# Patient Record
Sex: MALE | Race: WHITE | NOT HISPANIC OR LATINO | Employment: OTHER | URBAN - METROPOLITAN AREA
[De-identification: names, ages, dates, MRNs, and addresses within clinical notes are randomized per-mention and may not be internally consistent; named-entity substitution may affect disease eponyms.]

---

## 2024-08-07 ENCOUNTER — EVALUATION (OUTPATIENT)
Dept: PHYSICAL THERAPY | Facility: CLINIC | Age: 74
End: 2024-08-07
Payer: MEDICARE

## 2024-08-07 DIAGNOSIS — M25.562 CHRONIC PAIN OF LEFT KNEE: Primary | ICD-10-CM

## 2024-08-07 DIAGNOSIS — G89.29 CHRONIC PAIN OF LEFT KNEE: Primary | ICD-10-CM

## 2024-08-07 PROCEDURE — 97110 THERAPEUTIC EXERCISES: CPT | Performed by: PHYSICAL THERAPIST

## 2024-08-07 PROCEDURE — 97161 PT EVAL LOW COMPLEX 20 MIN: CPT | Performed by: PHYSICAL THERAPIST

## 2024-08-07 PROCEDURE — 97140 MANUAL THERAPY 1/> REGIONS: CPT | Performed by: PHYSICAL THERAPIST

## 2024-08-07 NOTE — PROGRESS NOTES
PT Evaluation     Today's date: 2024  Patient name: Jose Roach  : 1950  MRN: [id number]  Referring provider: Dax Leung, *  Dx:   Encounter Diagnosis     ICD-10-CM    1. Chronic pain of left knee  M25.562     G89.29                      Assessment  Impairments: abnormal muscle firing, abnormal muscle tone, abnormal or restricted ROM, impaired physical strength, lacks appropriate home exercise program and pain with function    Assessment details: Jose Roach is a pleasant 74 y.o. male presents with left knee pain. Pt with significant hamstring and RF restriction. Educated on stretching for home to address. Continue with further mobilization as tolerated.  No further referral appears necessary at this time.       Skilled PT services appropriate to facilitate return to prior level of function with transition to home exercise program for independent management when appropriate.    Understanding of Dx/Px/POC: good     Prognosis: good    Goals  Patient will be able to manage symptoms independently  LT weeks  1. pt will reach foto predicted  2. pt will decrease worst pain 75%   ST weeks  1. Pt will decrease worst pain 50%  2. Patient will successfully manage HEP        Plan  Patient would benefit from: skilled PT  Referral necessary: No  Planned modality interventions: thermotherapy: hydrocollator packs    Planned therapy interventions: home exercise program, manual therapy, neuromuscular re-education, patient education, functional ROM exercises, strengthening, stretching, joint mobilization, graded activity, graded exercise, therapeutic exercise, body mechanics training, motor coordination training and activity modification    Frequency: 2x week  Duration in weeks: 12  Treatment plan discussed with: patient        Subjective Evaluation    History of Present Illness  Mechanism of injury: Pt had a scope of the knee years ago and things have been worsening since. He had CSI in July  and felt relief for 2 days. Pain has been on the medial knee. He is taking meloxicam which is helping. He has continued with daily activiy including walking 4 miles once a week and going to the gym. Carrying more weight tends to botherPain can linger for about a day but then improves again.  He is attempting to push off a knee replacement for 1 year.         L knee MRI: Impression:  1. Radial tear medial meniscal posterior horn, involving the meniscal root.  There is severe medial compartment primary osteoarthritis which has worsened  compared with prior study..    2. 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    3. Mild lateral compartment primary osteoarthritis.    4. Prominent joint effusion.     Pain  Current pain ratin  At best pain ratin  At worst pain ratin          Objective     Strength/Myotome Testing     Left Knee   Flexion: 5  Extension: 5  Quadriceps contraction: good    Right Knee   Flexion: 5  Extension: 5  Quadriceps contraction: good    General Comments:      Knee Comments  L Patellar inferior/medial mobility limited  L medial hamstring significant restriction  L prone knee flexion 95*  Standing knee posture: increased varus b/l    Improvement in squatting pain post patellar/hamstring mobilization               Precautions: none      Manuals             Medial hamstring STM CB            Patellar mobs CB            Medial joint line gapping nv                         Neuro Re-Ed                                                                                                        Ther Ex             Hamstring active elongation reviewed            EOT RF stretch w/strap reviewed            bike nv            Standing gastroc stretch nv            bridge nv            SL clamshell nv            SL hip abd nv                         Ther Activity                                       Gait Training                                       Modalities

## 2024-08-07 NOTE — LETTER
2024    Dax Leung, DO  [address] [place] [address]    Patient: Jose Roach   YOB: 1950   Date of Visit: 2024     Encounter Diagnosis     ICD-10-CM    1. Chronic pain of left knee  M25.562     G89.29           Dear Dr. Leung:    Thank you for your recent referral of Jose Roach. Please review the attached evaluation summary from Jose's recent visit.     Please verify that you agree with the plan of care by signing the attached order.     If you have any questions or concerns, please do not hesitate to call.     I sincerely appreciate the opportunity to share in the care of one of your patients and hope to have another opportunity to work with you in the near future.       Sincerely,    Andrés Gutierrez, PT      Referring Provider:      I certify that I have read the below Plan of Care and certify the need for these services furnished under this plan of treatment while under my care.                    Dax Leung DO  [address] [place] [address]  Via In Basket          PT Evaluation     Today's date: 2024  Patient name: Jose Roach  : 1950  MRN: [id number]  Referring provider: Dax Leung, *  Dx:   Encounter Diagnosis     ICD-10-CM    1. Chronic pain of left knee  M25.562     G89.29                      Assessment  Impairments: abnormal muscle firing, abnormal muscle tone, abnormal or restricted ROM, impaired physical strength, lacks appropriate home exercise program and pain with function    Assessment details: Jose Roach is a pleasant 74 y.o. male presents with left knee pain. Pt with significant hamstring and RF restriction. Educated on stretching for home to address. Continue with further mobilization as tolerated.  No further referral appears necessary at this time.       Skilled PT services appropriate to facilitate return to prior level of function with transition to home exercise program  for independent management when appropriate.    Understanding of Dx/Px/POC: good     Prognosis: good    Goals  Patient will be able to manage symptoms independently  LT weeks  1. pt will reach foto predicted  2. pt will decrease worst pain 75%   ST weeks  1. Pt will decrease worst pain 50%  2. Patient will successfully manage HEP        Plan  Patient would benefit from: skilled PT  Referral necessary: No  Planned modality interventions: thermotherapy: hydrocollator packs    Planned therapy interventions: home exercise program, manual therapy, neuromuscular re-education, patient education, functional ROM exercises, strengthening, stretching, joint mobilization, graded activity, graded exercise, therapeutic exercise, body mechanics training, motor coordination training and activity modification    Frequency: 2x week  Duration in weeks: 12  Treatment plan discussed with: patient        Subjective Evaluation    History of Present Illness  Mechanism of injury: Pt had a scope of the knee years ago and things have been worsening since. He had CSI in July and felt relief for 2 days. Pain has been on the medial knee. He is taking meloxicam which is helping. He has continued with daily activiy including walking 4 miles once a week and going to the gym. Carrying more weight tends to botherPain can linger for about a day but then improves again.  He is attempting to push off a knee replacement for 1 year.         L knee MRI: Impression:  1. Radial tear medial meniscal posterior horn, involving the meniscal root.  There is severe medial compartment primary osteoarthritis which has worsened  compared with prior study..    2. 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    3. Mild lateral compartment primary osteoarthritis.    4. Prominent joint effusion.     Pain  Current pain ratin  At best pain ratin  At worst pain ratin          Objective     Strength/Myotome Testing     Left Knee   Flexion: 5  Extension:  5  Quadriceps contraction: good    Right Knee   Flexion: 5  Extension: 5  Quadriceps contraction: good    General Comments:      Knee Comments  L Patellar inferior/medial mobility limited  L medial hamstring significant restriction  L prone knee flexion 95*  Standing knee posture: increased varus b/l    Improvement in squatting pain post patellar/hamstring mobilization               Precautions: none      Manuals 8/7            Medial hamstring STM CB            Patellar mobs CB            Medial joint line gapping nv                         Neuro Re-Ed                                                                                                        Ther Ex             Hamstring active elongation reviewed            EOT RF stretch w/strap reviewed            bike nv            Standing gastroc stretch nv            bridge nv            SL clamshell nv            SL hip abd nv                         Ther Activity                                       Gait Training                                       Modalities

## 2024-08-09 ENCOUNTER — OFFICE VISIT (OUTPATIENT)
Dept: PHYSICAL THERAPY | Facility: CLINIC | Age: 74
End: 2024-08-09
Payer: MEDICARE

## 2024-08-09 DIAGNOSIS — G89.29 CHRONIC PAIN OF LEFT KNEE: Primary | ICD-10-CM

## 2024-08-09 DIAGNOSIS — M25.562 CHRONIC PAIN OF LEFT KNEE: Primary | ICD-10-CM

## 2024-08-09 PROCEDURE — 97110 THERAPEUTIC EXERCISES: CPT

## 2024-08-09 PROCEDURE — 97140 MANUAL THERAPY 1/> REGIONS: CPT

## 2024-08-09 NOTE — PROGRESS NOTES
"Daily Note     Today's date: 2024  Patient name: Jose Roach  : 1950  MRN: [id number]  Referring provider: Dax Leung, *  Dx:   Encounter Diagnosis     ICD-10-CM    1. Chronic pain of left knee  M25.562     G89.29           Start Time: 0730  Stop Time: 0810  Total time in clinic (min): 40 minutes    Subjective: Pt reports he was a little \" sore\" from previous session, however it dissipated after a day.  Yesterday he did a lot of walking.       Objective: See treatment diary below      Assessment: Tolerated treatment well. Session began on bike for ROM and warm-up to promote blood flow circulation in preparation for therapy today. Pt displayed hypomobility in L patella- PT performed mobs to address deficits. Pt benefited from STM to medial knee/ HS to release soft tissue tension with some relief noted post treatment.  Pt benefits form VC' and demo's for proximal hip TE's to improve intended musculature recruitment with good response and practice.  Patient would benefit from continued PT       Plan: Continue per plan of care.      Precautions: none      Manuals            Medial hamstring STM CB JM            Patellar mobs CB CB     PROM  JM           Medial joint line gapping nv CB                        Neuro Re-Ed                                                                                                        Ther Ex             Hamstring active elongation reviewed 30\" x3           EOT RF stretch w/strap reviewed 30\" x3           bike nv 8'           Standing gastroc stretch nv 30\" x3           bridge nv 3\" 2x10            SL clamshell nv 2x10           SL hip abd nv 2x10                        Ther Activity                                       Gait Training                                       Modalities                                            "

## 2024-08-16 ENCOUNTER — OFFICE VISIT (OUTPATIENT)
Dept: PHYSICAL THERAPY | Facility: CLINIC | Age: 74
End: 2024-08-16
Payer: MEDICARE

## 2024-08-16 DIAGNOSIS — M25.562 CHRONIC PAIN OF LEFT KNEE: Primary | ICD-10-CM

## 2024-08-16 DIAGNOSIS — G89.29 CHRONIC PAIN OF LEFT KNEE: Primary | ICD-10-CM

## 2024-08-16 PROCEDURE — 97112 NEUROMUSCULAR REEDUCATION: CPT

## 2024-08-16 PROCEDURE — 97140 MANUAL THERAPY 1/> REGIONS: CPT

## 2024-08-16 PROCEDURE — 97110 THERAPEUTIC EXERCISES: CPT

## 2024-08-16 NOTE — PROGRESS NOTES
"Daily Note     Today's date: 2024  Patient name: Jose Roach  : 1950  MRN: [id number]  Referring provider: Dax Leung, *  Dx:   Encounter Diagnosis     ICD-10-CM    1. Chronic pain of left knee  M25.562     G89.29                      Subjective: Pt reports performing HEP with good tolerance. Pt denies increase pain with progression of exercise last visit.  Pt c/o left knee pain with walking downhill and turning/pivoting mostly.         Objective: See treatment diary below      Assessment: Tolerated treatment well. Good tolerance to exercise with no c/o pain and verbal/tactile cues provided for proper technique.       Plan: Continue per plan of care.   Pt will continue x1 visit with possible D/C to HEP per pt request.       Precautions: none      Manuals           Medial hamstring STM CB JM  GH          Patellar mobs CB CB     PROM  JM GH          Medial joint line gapping nv CB                        Neuro Re-Ed                                                                                                        Ther Ex             Hamstring active elongation reviewed 30\" x3 10\"x 10          EOT RF stretch w/strap reviewed 30\" x3 GH          bike nv 8' 5' L1          Standing gastroc stretch nv 30\" x3 GH          bridge nv 3\" 2x10  reviewed          SL clamshell nv 2x10 GTB 10\"x  10          SL hip abd nv 2x10 10\"x 10                       Ther Activity                                       Gait Training                                       Modalities                                              "

## 2024-08-30 ENCOUNTER — APPOINTMENT (OUTPATIENT)
Dept: PHYSICAL THERAPY | Facility: CLINIC | Age: 74
End: 2024-08-30
Payer: MEDICARE